# Patient Record
Sex: MALE | Race: WHITE | Employment: FULL TIME | URBAN - METROPOLITAN AREA
[De-identification: names, ages, dates, MRNs, and addresses within clinical notes are randomized per-mention and may not be internally consistent; named-entity substitution may affect disease eponyms.]

---

## 2017-11-29 ENCOUNTER — OFFICE VISIT (OUTPATIENT)
Dept: PEDIATRICS | Facility: CLINIC | Age: 57
End: 2017-11-29
Payer: COMMERCIAL

## 2017-11-29 VITALS
BODY MASS INDEX: 29.26 KG/M2 | TEMPERATURE: 97.8 F | WEIGHT: 209 LBS | DIASTOLIC BLOOD PRESSURE: 70 MMHG | HEIGHT: 71 IN | OXYGEN SATURATION: 95 % | HEART RATE: 69 BPM | SYSTOLIC BLOOD PRESSURE: 110 MMHG

## 2017-11-29 DIAGNOSIS — L71.9 ROSACEA: Primary | ICD-10-CM

## 2017-11-29 DIAGNOSIS — H10.9 CONJUNCTIVITIS, UNSPECIFIED CONJUNCTIVITIS TYPE, UNSPECIFIED LATERALITY: ICD-10-CM

## 2017-11-29 PROCEDURE — 99213 OFFICE O/P EST LOW 20 MIN: CPT | Performed by: INTERNAL MEDICINE

## 2017-11-29 RX ORDER — POLYMYXIN B SULFATE AND TRIMETHOPRIM 1; 10000 MG/ML; [USP'U]/ML
1 SOLUTION OPHTHALMIC
Qty: 1 BOTTLE | Refills: 0 | Status: SHIPPED | OUTPATIENT
Start: 2017-11-29 | End: 2017-12-06

## 2017-11-29 RX ORDER — AZELAIC ACID 0.15 G/G
0.5 GEL TOPICAL 2 TIMES DAILY
Qty: 50 G | Refills: 3 | Status: SHIPPED | OUTPATIENT
Start: 2017-11-29

## 2017-11-29 NOTE — NURSING NOTE
"Chief Complaint   Patient presents with     Infection       Initial /70 (Cuff Size: Adult Regular)  Pulse 69  Temp 97.8  F (36.6  C) (Oral)  Ht 5' 11\" (1.803 m)  Wt 209 lb (94.8 kg)  SpO2 95%  BMI 29.15 kg/m2 Estimated body mass index is 29.15 kg/(m^2) as calculated from the following:    Height as of this encounter: 5' 11\" (1.803 m).    Weight as of this encounter: 209 lb (94.8 kg).  Medication Reconciliation: gordon Banks, JOSE MARIA    "

## 2017-11-29 NOTE — PATIENT INSTRUCTIONS
Rosacea  What is rosacea?   Rosacea is a skin problem that affects the nose and face. It causes redness and lumps. Blood vessels become more visible. Sometimes the nose gets larger and looks misshapen.   Rosacea can happen at any age, but it is most often seen in adults who are fair-skinned between the ages of 30 and 50.   How does it occur?   The cause of rosacea is not well understood. It seems to run in families and so may be inherited. It may be caused by overactive blood vessels in the skin. Contrary to popular belief, rosacea is not caused by alcoholism.   Rosacea is not related to the pimples and cysts of acne. But people who have rosacea may also have acne. Acne and rosacea are often treated with the same medicines.   What are the symptoms?   The most common symptoms are a red nose and visible blood vessel patterns on the nose. In women, redness and blood vessels may appear only on the cheeks and chin. Over time the surface of the nose may become lumpy and look swollen. The nose can become quite enlarged, and its surface may become thickened with scar tissue.   Sometimes rosacea also affects the eyelids, which become red and swollen. Rarely, the surface of the eyes may be affected, causing a sense of burning and grittiness.   How is it diagnosed?   Your healthcare provider will examine your skin. In rare cases a skin biopsy (removal of a small sample of skin) may be done to confirm the diagnosis.   How is it treated?   If you have increased flushing and blushing that does not go away and gets worse, you should see your healthcare provider. Treatment of rosacea is very important because it can permanently damage facial tissues.   Rosacea is often first treated with antibiotics. Some of these medicines are taken by mouth. Others are put on the skin.   If rosacea is affecting your eyes, your provider may prescribe antibiotic medicine for your eyes. You may be referred to an eye doctor.   For  more severe cases of rosacea, an oral medicine called isotretinoin may be prescribed. Women must use this medicine very carefully. Isotretinoin causes birth defects if a woman takes it 1 to 2 months before she gets pregnant or during pregnancy.  A medicine called Finacea (azelaic acid) may be prescribed for rosacea. It is a gel medicine for acne that can be put on your skin.   Steroid creams put on the face can sometimes help. These creams should be used only under the supervision of a healthcare provider even if they are nonprescription. Sometimes the use of steroids for an extended period of time can cause skin damage, especially when they are used incorrectly on the face.   If the usual medicines do not help the problem, and especially if your nose is becoming enlarged or deformed, you may need to see a dermatologist. Dermatologists are skin specialists who can suggest other possible treatments, including various types of surgery. In some cases laser surgery is an effective treatment for rosacea. The sooner you see a dermatologist, the more effective their treatment is likely to be.   How long will the effects last?     It is rare for rosacea to go away on its own and the condition usually worsens over time. Rosacea can be successfully treated if it is diagnosed in its early stages.   How can I take care of myself?   Follow the treatment prescribed by your healthcare provider. Use the medicines as prescribed.   Avoid rubbing or massaging your face if it seems to irritate the inflamed skin.   Overexposure to sunlight can worsen the effects of rosacea. Limit your exposure to sunlight. When you are out in the sun, use sunscreen.   Both men and women with rosacea often use makeup to cover the skin changes. Make sure you avoid using irritating cosmetics.   Avoid getting hairspray and other irritating cosmetics on your face.

## 2017-11-29 NOTE — MR AVS SNAPSHOT
After Visit Summary   11/29/2017    Ronnie Oh    MRN: [id number]           Patient Information     Date Of Birth          1960        Visit Information        Provider Department      11/29/2017 11:20 AM Dmitri Lawson MD [facility]        Today's Diagnoses     Conjunctivitis, unspecified conjunctivitis type, unspecified laterality    -  1    Rosacea          Care Instructions                     Rosacea  What is rosacea?   Rosacea is a skin problem that affects the nose and face. It causes redness and lumps. Blood vessels become more visible. Sometimes the nose gets larger and looks misshapen.   Rosacea can happen at any age, but it is most often seen in adults who are fair-skinned between the ages of 30 and 50.   How does it occur?   The cause of rosacea is not well understood. It seems to run in families and so may be inherited. It may be caused by overactive blood vessels in the skin. Contrary to popular belief, rosacea is not caused by alcoholism.   Rosacea is not related to the pimples and cysts of acne. But people who have rosacea may also have acne. Acne and rosacea are often treated with the same medicines.   What are the symptoms?   The most common symptoms are a red nose and visible blood vessel patterns on the nose. In women, redness and blood vessels may appear only on the cheeks and chin. Over time the surface of the nose may become lumpy and look swollen. The nose can become quite enlarged, and its surface may become thickened with scar tissue.   Sometimes rosacea also affects the eyelids, which become red and swollen. Rarely, the surface of the eyes may be affected, causing a sense of burning and grittiness.   How is it diagnosed?   Your healthcare provider will examine your skin. In rare cases a skin biopsy (removal of a small sample of skin) may be done to confirm the diagnosis.   How is it treated?   If you have increased flushing and blushing that does  not go away and gets worse, you should see your healthcare provider. Treatment of rosacea is very important because it can permanently damage facial tissues.   Rosacea is often first treated with antibiotics. Some of these medicines are taken by mouth. Others are put on the skin.   If rosacea is affecting your eyes, your provider may prescribe antibiotic medicine for your eyes. You may be referred to an eye doctor.   For more severe cases of rosacea, an oral medicine called isotretinoin may be prescribed. Women must use this medicine very carefully. Isotretinoin causes birth defects if a woman takes it 1 to 2 months before she gets pregnant or during pregnancy.  A medicine called Finacea (azelaic acid) may be prescribed for rosacea. It is a gel medicine for acne that can be put on your skin.   Steroid creams put on the face can sometimes help. These creams should be used only under the supervision of a healthcare provider even if they are nonprescription. Sometimes the use of steroids for an extended period of time can cause skin damage, especially when they are used incorrectly on the face.   If the usual medicines do not help the problem, and especially if your nose is becoming enlarged or deformed, you may need to see a dermatologist. Dermatologists are skin specialists who can suggest other possible treatments, including various types of surgery. In some cases laser surgery is an effective treatment for rosacea. The sooner you see a dermatologist, the more effective their treatment is likely to be.   How long will the effects last?     It is rare for rosacea to go away on its own and the condition usually worsens over time. Rosacea can be successfully treated if it is diagnosed in its early stages.   How can I take care of myself?   Follow the treatment prescribed by your healthcare provider. Use the medicines as prescribed.   Avoid rubbing or massaging your face if it seems to irritate the inflamed skin.  "  Overexposure to sunlight can worsen the effects of rosacea. Limit your exposure to sunlight. When you are out in the sun, use sunscreen.   Both men and women with rosacea often use makeup to cover the skin changes. Make sure you avoid using irritating cosmetics.   Avoid getting hairspray and other irritating cosmetics on your face.             Follow-ups after your visit        Who to contact     If you have questions or need follow up information about today's clinic visit or your schedule please contact [facility] directly at [phone].  Normal or non-critical lab and imaging results will be communicated to you by Fuzehart, letter or phone within 4 business days after the clinic has received the results. If you do not hear from us within 7 days, please contact the clinic through LinkStormt or phone. If you have a critical or abnormal lab result, we will notify you by phone as soon as possible.  Submit refill requests through Bellhops or call your pharmacy and they will forward the refill request to us. Please allow 3 business days for your refill to be completed.          Additional Information About Your Visit        Fuzehart Information     Bellhops gives you secure access to your electronic health record. If you see a primary care provider, you can also send messages to your care team and make appointments. If you have questions, please call your primary care clinic.  If you do not have a primary care provider, please call [phone] and they will assist you.        Care EveryWhere ID     This is your Care EveryWhere ID. This could be used by other organizations to access your [place] medical records  [id number]        Your Vitals Were     Pulse Temperature Height Pulse Oximetry BMI (Body Mass Index)       69 97.8  F (36.6  C) (Oral) 5' 11\" (1.803 m) 95% 29.15 kg/m2        Blood Pressure from Last 3 Encounters:   11/29/17 110/70   09/29/16 120/83   09/19/16 118/78    Weight from Last 3 " Encounters:   11/29/17 209 lb (94.8 kg)   09/29/16 218 lb 8 oz (99.1 kg)   09/19/16 216 lb (98 kg)              Today, you had the following     No orders found for display         Today's Medication Changes          These changes are accurate as of: 11/29/17 12:01 PM.  If you have any questions, ask your nurse or doctor.               Start taking these medicines.        Dose/Directions    Azelaic Acid 15 % gel   Used for:  Rosacea   Started by:  Dmitri Lawson MD        Dose:  0.5 inch   Apply 0.5 inches topically 2 times daily Massage thin film gently into afected areas morning and evening.   Quantity:  50 g   Refills:  3       trimethoprim-polymyxin b ophthalmic solution   Commonly known as:  POLYTRIM   Used for:  Conjunctivitis, unspecified conjunctivitis type, unspecified laterality   Started by:  Dmitri Lawson MD        Dose:  1 drop   Apply 1 drop to eye every 3 hours for 7 days   Quantity:  1 Bottle   Refills:  0         Stop taking these medicines if you haven't already. Please contact your care team if you have questions.     albuterol 108 (90 BASE) MCG/ACT Inhaler   Commonly known as:  PROAIR HFA/PROVENTIL HFA/VENTOLIN HFA   Stopped by:  Dmitri Lawson MD           predniSONE 20 MG tablet   Commonly known as:  DELTASONE   Stopped by:  Dmitri Lawson MD                Where to get your medicines      These medications were sent to [facility]/pharmacy #[id number] [place], [place] - [address] [address]     Phone:  [phone]     Azelaic Acid 15 % gel    trimethoprim-polymyxin b ophthalmic solution                Primary Care Provider Office Phone # Fax #    Dmitri Lawson [phone]       [facility] [place] [address]        Equal Access to Services     SALVATORE HERNANDEZ AH: David Fletcher, slime sena, angel mina, yolande saenz. So [facility]  [phone].    ATENCIÓN: Si rafa perales, tiene a leggett disposición servicios gratuitos de asistencia lingüística. Zenaida newell [phone].    We comply with applicable federal civil rights laws and [place] laws. We do not discriminate on the basis of race, color, national origin, age, disability, sex, sexual orientation, or gender identity.            Thank you!     Thank you for choosing [facility]  for your care. Our goal is always to provide you with excellent care. Hearing back from our patients is one way we can continue to improve our services. Please take a few minutes to complete the written survey that you may receive in the mail after your visit with us. Thank you!             Your Updated Medication List - Protect others around you: Learn how to safely use, store and throw away your medicines at www.disposemymeds.org.          This list is accurate as of: 11/29/17 12:01 PM.  Always use your most recent med list.                   Brand Name Dispense Instructions for use Diagnosis    Azelaic Acid 15 % gel     50 g    Apply 0.5 inches topically 2 times daily Massage thin film gently into afected areas morning and evening.    Rosacea       loratadine 10 MG tablet    CLARITIN     Take 10 mg by mouth daily.        Multi-vitamin Tabs tablet   Generic drug:  multivitamin, therapeutic with minerals     90 tablet    Take 1 tablet by mouth daily.        olopatadine 0.1 % ophthalmic solution    PATANOL    5 mL    Place 1 drop into both eyes 2 times daily    Allergic conjunctivitis, unspecified laterality       trimethoprim-polymyxin b ophthalmic solution    POLYTRIM    1 Bottle    Apply 1 drop to eye every 3 hours for 7 days    Conjunctivitis, unspecified conjunctivitis type, unspecified laterality

## 2017-11-29 NOTE — PROGRESS NOTES
SUBJECTIVE:   Ronnie Oh is a 57 year old male who presents to clinic today for the following health issues:      Rash-right side of nose      Duration: 3-4 days    Description (location/character/radiation): red, swollen    Intensity:  mild    Accompanying signs and symptoms: painful to touch    History (similar episodes/previous evaluation): prior similar episodes past few years          father-hx of rosacea    Precipitating or alleviating factors: None    Therapies tried and outcome: None       Patient Active Problem List   Diagnosis     BPH (benign prostatic hypertrophy)     Obesity     CARDIOVASCULAR SCREENING; LDL GOAL LESS THAN 160     Allergic conjunctivitis     Seasonal allergies     Polysubstance abuse     Past Surgical History:   Procedure Laterality Date     ARTHROSCOPY KNEE WITH MENISCAL REPAIR      meniscal repair     COLONOSCOPY  04/18/12    Dr. Che     COLONOSCOPY  4/18/2012    Procedure:COLONOSCOPY; COLONOSCOPY   ; Surgeon:NAIF CHE; Location:[facility]     STRIP VEIN BILATERAL      vein stripping for varicose veins     SURGICAL PATHOLOGY EXAM         Social History   Substance Use Topics     Smoking status: Former Smoker     Smokeless tobacco: Former User     Alcohol use No     Family History   Problem Relation Age of Onset     DIABETES No family hx of      Cancer - colorectal No family hx of      Prostate Cancer No family hx of      Hypertension No family hx of      C.A.D. No family hx of          Current Outpatient Prescriptions   Medication Sig Dispense Refill     trimethoprim-polymyxin b (POLYTRIM) ophthalmic solution Apply 1 drop to eye every 3 hours for 7 days 1 Bottle 0     Azelaic Acid 15 % gel Apply 0.5 inches topically 2 times daily Massage thin film gently into afected areas morning and evening. 50 g 3     olopatadine (PATANOL) 0.1 % ophthalmic solution Place 1 drop into both eyes 2 times daily 5 mL 6     loratadine (CLARITIN) 10 MG tablet Take 10 mg by mouth daily.    "    Multiple Vitamin (MULTI-VITAMIN) per tablet Take 1 tablet by mouth daily. 90 tablet 3       ROS: The following systems have been completely reviewed and are negative except as noted in the HPI: CONSTITUTIONAL, HEAD AND NECK    OBJECTIVE:                                                    /70 (Cuff Size: Adult Regular)  Pulse 69  Temp 97.8  F (36.6  C) (Oral)  Ht 5' 11\" (1.803 m)  Wt 209 lb (94.8 kg)  SpO2 95%  BMI 29.15 kg/m2 Body mass index is 29.15 kg/(m^2).  GENERAL:  alert,  no distress  HENT: OP normal.  Eyes: conj pink, sclera normal      Erythematous indurated patch 2-3cm in size adjacent to right side of nose/does not involve eye    Facial telangiectasias/erythema across cheeks and bridge of nose  NECK: no tenderness, no adenopathy       ASSESSMENT/PLAN:                                                        ICD-10-CM    1. Rosacea L71.9 Azelaic Acid 15 % gel     Dx discussed, started trial of finacea gel     2. Conjunctivitis, unspecified conjunctivitis type, unspecified laterality H10.9 trimethoprim-polymyxin b (POLYTRIM) ophthalmic solution    Pt also requests script for polytrim for recurrent pink eye (no sx currently)/ signs and sx of acute conjunctivitis reviewed        Dmitri Lawson MD  [facility]     "

## 2019-10-03 ENCOUNTER — HEALTH MAINTENANCE LETTER (OUTPATIENT)
Age: 59
End: 2019-10-03

## 2020-09-15 ENCOUNTER — MYC MEDICAL ADVICE (OUTPATIENT)
Dept: PEDIATRICS | Facility: CLINIC | Age: 60
End: 2020-09-15

## 2020-09-26 ENCOUNTER — OFFICE VISIT (OUTPATIENT)
Dept: URGENT CARE | Facility: URGENT CARE | Age: 60
End: 2020-09-26
Payer: COMMERCIAL

## 2020-09-26 VITALS
BODY MASS INDEX: 30.27 KG/M2 | WEIGHT: 217 LBS | DIASTOLIC BLOOD PRESSURE: 83 MMHG | OXYGEN SATURATION: 96 % | TEMPERATURE: 96.4 F | HEART RATE: 61 BPM | RESPIRATION RATE: 12 BRPM | SYSTOLIC BLOOD PRESSURE: 128 MMHG

## 2020-09-26 DIAGNOSIS — G56.90 NEUROPATHY OF UPPER EXTREMITY, UNSPECIFIED LATERALITY: Primary | ICD-10-CM

## 2020-09-26 PROCEDURE — 99213 OFFICE O/P EST LOW 20 MIN: CPT | Performed by: FAMILY MEDICINE

## 2020-09-26 RX ORDER — CETIRIZINE HYDROCHLORIDE 10 MG/1
10 TABLET ORAL DAILY
COMMUNITY

## 2020-09-26 NOTE — PROGRESS NOTES
SUBJECTIVE:  Ronnie Oh, a 60 year old male scheduled an appointment to discuss the following issues:  Neuropathy of upper extremity, unspecified laterality    Medical, social, surgical, and family histories reviewed.    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  No pain or redness or swelling.  Pt is left handed he  who types a lot.  He rode his bike for one hour (13.8 miles in 56 minutes) the day before, and played golf the day before that.  Not on any medication.  Likes to sleep on his left side pressing on his left arm.  Pt is left handed.  No change in speech or vision or memory.  Numbness and tingling has improved as the day goes on with more movement.    ROS:  See HPI.  No nausea/vomiting.  No fever/chills.  No chest pain/SOB.  No BM/urine problems.  No dizziness or syncope.      OBJECTIVE:  /83   Pulse 61   Temp 96.4  F (35.8  C) (Temporal)   Resp 12   Wt 98.4 kg (217 lb)   SpO2 96%   BMI 30.27 kg/m    EXAM:  GENERAL APPEARANCE: healthy, alert and no distress, afebrile, no cyanosis or accessory muscle use; normal vitals  EYES: Eyes grossly normal to inspection, PERRL and conjunctivae and sclerae normal  HENT: ear canals and TM's normal and nose and mouth without ulcers or lesions  NECK: no adenopathy, no asymmetry, masses, or scars and neck normal to palpation  RESP: lungs clear to auscultation - no rales, rhonchi or wheezes  CV: regular rates and rhythm, normal S1 S2, no S3 or S4 and no murmur, click or rub  LYMPHATICS: no cervical adenopathy  ABDOMEN: soft, nontender, without hepatosplenomegaly or masses and bowel sounds normal  MS: extremities normal- no gross deformities noted  SKIN: no suspicious lesions or rashes  NEURO: Normal strength and tone, mentation intact and speech normal; no facial asymmetry or pronator drift; normal gait; positive tinnel  signs, negative Phalen; some tenderness at left ulnar groove; normal 5/5 strength bilateral biceps and triceps; normal ROM bilateral shoulders and wrists.  Normal radial pulses.    ASSESSMENT/PLAN:  (G56.90) Neuropathy of upper extremity, unspecified laterality  (primary encounter diagnosis)  Comment: involving left median and ulnar nerve---likely peripheral; could have carpal tunnel syndrome  Plan: NEUROLOGY ADULT REFERRAL      Care instructions given.     Pt to f/up PCP/neurology in 2-3 weeks if no improvement or new problems arise.  Warning signs and symptoms explained---be seen ASAP if worsening.

## 2020-09-26 NOTE — PATIENT INSTRUCTIONS
Patient Education     Peripheral Neuropathy  Peripheral neuropathy is the result of damage to the peripheral nerves. It usually affects the arms or legs, and causes a change in physical feeling. Sometimes it causes weakness in the muscles. You may feel tingling, numbness or shooting pains. Symptoms may be more common at night. Skin may be extra sensitive to light touch or temperature changes.  Neuropathy may be caused by a complication of a chronic disease such as diabetes, virus or bacterial infections, or physical injury. A ruptured disk with pressure on the spinal nerve may also lead to the problem. Certain vitamin deficiencies may also lead to it. It may also be caused by exposure to certain drugs or chemicals.  Home care    Tell the healthcare provider about all medicines you take. This includes prescription and over-the-counter medicines, vitamins, and herbs. Ask if any of the medicines may be causing your problems. Don't make any changes to prescription medicines without talking to your healthcare provider first.    You may be prescribed medicines to help relieve the tingling feeling or for pain. Take all medicines as directed.    A numb hand or foot may be more prone to injury. To help protect it:  ? Always use oven mitts.  ? Test water with an unaffected hand or foot.  ? Use caution when trimming nails. File sharp areas.  ? Wear shoes that fit well to avoid pressure points, blisters, and ulcers.  ? Inspect your hands and feet carefully (including the soles of your feet and between your toes) at least once a week. If you see red areas, sores, or other problems, tell your healthcare provider.    Follow-up care  Follow up with your doctor or as advised by our staff. You may need further testing or evaluation.  When to seek medical advice  Call your healthcare provider right away if any of the following occur:    Redness, swelling, cracking, or ulcer on any numb area, especially the feet    New symptoms of  numbness or muscle weakness numbness    Loss of bowel or bladder control    Slurred speech, confusion, or trouble speaking, walking, or seeing  Date Last Reviewed: 3/1/2018    [id number] [employer]. [address] [place] [address], [place] [address]. All rights reserved. This information is not intended as a substitute for professional medical care. Always follow your healthcare professional's instructions.

## 2020-10-20 ENCOUNTER — OFFICE VISIT (OUTPATIENT)
Dept: PEDIATRICS | Facility: CLINIC | Age: 60
End: 2020-10-20
Payer: COMMERCIAL

## 2020-10-20 VITALS
WEIGHT: 220.9 LBS | TEMPERATURE: 97.3 F | SYSTOLIC BLOOD PRESSURE: 120 MMHG | HEART RATE: 73 BPM | DIASTOLIC BLOOD PRESSURE: 64 MMHG | OXYGEN SATURATION: 97 % | BODY MASS INDEX: 30.81 KG/M2

## 2020-10-20 DIAGNOSIS — Z12.11 SCREENING FOR COLON CANCER: ICD-10-CM

## 2020-10-20 DIAGNOSIS — F19.10 POLYSUBSTANCE ABUSE (H): ICD-10-CM

## 2020-10-20 DIAGNOSIS — Z12.5 SCREENING FOR PROSTATE CANCER: ICD-10-CM

## 2020-10-20 DIAGNOSIS — Z13.6 CARDIOVASCULAR SCREENING; LDL GOAL LESS THAN 160: ICD-10-CM

## 2020-10-20 DIAGNOSIS — Z00.00 ROUTINE GENERAL MEDICAL EXAMINATION AT A HEALTH CARE FACILITY: Primary | ICD-10-CM

## 2020-10-20 LAB
ALBUMIN SERPL-MCNC: 4.2 G/DL (ref 3.4–5)
ALP SERPL-CCNC: 46 U/L (ref 40–150)
ALT SERPL W P-5'-P-CCNC: 41 U/L (ref 0–70)
ANION GAP SERPL CALCULATED.3IONS-SCNC: 5 MMOL/L (ref 3–14)
AST SERPL W P-5'-P-CCNC: 23 U/L (ref 0–45)
BASOPHILS # BLD AUTO: 0 10E9/L (ref 0–0.2)
BASOPHILS NFR BLD AUTO: 0.2 %
BILIRUB SERPL-MCNC: 1 MG/DL (ref 0.2–1.3)
BUN SERPL-MCNC: 21 MG/DL (ref 7–30)
CALCIUM SERPL-MCNC: 10 MG/DL (ref 8.5–10.1)
CHLORIDE SERPL-SCNC: 109 MMOL/L (ref 94–109)
CHOLEST SERPL-MCNC: 211 MG/DL
CO2 SERPL-SCNC: 27 MMOL/L (ref 20–32)
CREAT SERPL-MCNC: 1.07 MG/DL (ref 0.66–1.25)
DIFFERENTIAL METHOD BLD: NORMAL
EOSINOPHIL # BLD AUTO: 0.3 10E9/L (ref 0–0.7)
EOSINOPHIL NFR BLD AUTO: 4.4 %
ERYTHROCYTE [DISTWIDTH] IN BLOOD BY AUTOMATED COUNT: 14.3 % (ref 10–15)
GFR SERPL CREATININE-BSD FRML MDRD: 75 ML/MIN/{1.73_M2}
GLUCOSE SERPL-MCNC: 83 MG/DL (ref 70–99)
HCT VFR BLD AUTO: 46.9 % (ref 40–53)
HDLC SERPL-MCNC: 45 MG/DL
HGB BLD-MCNC: 15.7 G/DL (ref 13.3–17.7)
LDLC SERPL CALC-MCNC: 146 MG/DL
LYMPHOCYTES # BLD AUTO: 2.1 10E9/L (ref 0.8–5.3)
LYMPHOCYTES NFR BLD AUTO: 32.8 %
MCH RBC QN AUTO: 29.8 PG (ref 26.5–33)
MCHC RBC AUTO-ENTMCNC: 33.5 G/DL (ref 31.5–36.5)
MCV RBC AUTO: 89 FL (ref 78–100)
MONOCYTES # BLD AUTO: 0.6 10E9/L (ref 0–1.3)
MONOCYTES NFR BLD AUTO: 9.5 %
NEUTROPHILS # BLD AUTO: 3.4 10E9/L (ref 1.6–8.3)
NEUTROPHILS NFR BLD AUTO: 53.1 %
NONHDLC SERPL-MCNC: 166 MG/DL
PLATELET # BLD AUTO: 228 10E9/L (ref 150–450)
POTASSIUM SERPL-SCNC: 4.1 MMOL/L (ref 3.4–5.3)
PROT SERPL-MCNC: 7.8 G/DL (ref 6.8–8.8)
PSA SERPL-ACNC: 1.48 UG/L (ref 0–4)
RBC # BLD AUTO: 5.26 10E12/L (ref 4.4–5.9)
SODIUM SERPL-SCNC: 141 MMOL/L (ref 133–144)
TRIGL SERPL-MCNC: 102 MG/DL
WBC # BLD AUTO: 6.4 10E9/L (ref 4–11)

## 2020-10-20 PROCEDURE — 36415 COLL VENOUS BLD VENIPUNCTURE: CPT | Performed by: INTERNAL MEDICINE

## 2020-10-20 PROCEDURE — 85025 COMPLETE CBC W/AUTO DIFF WBC: CPT | Performed by: INTERNAL MEDICINE

## 2020-10-20 PROCEDURE — 99396 PREV VISIT EST AGE 40-64: CPT | Performed by: INTERNAL MEDICINE

## 2020-10-20 PROCEDURE — 80061 LIPID PANEL: CPT | Performed by: INTERNAL MEDICINE

## 2020-10-20 PROCEDURE — G0103 PSA SCREENING: HCPCS | Performed by: INTERNAL MEDICINE

## 2020-10-20 PROCEDURE — 80053 COMPREHEN METABOLIC PANEL: CPT | Performed by: INTERNAL MEDICINE

## 2020-10-20 ASSESSMENT — ENCOUNTER SYMPTOMS
ABDOMINAL PAIN: 0
HEMATURIA: 0
DIZZINESS: 0
CONSTIPATION: 0
DIARRHEA: 0
EYE PAIN: 0
COUGH: 0
CHILLS: 0
HEMATOCHEZIA: 0
NERVOUS/ANXIOUS: 0

## 2020-10-20 NOTE — PROGRESS NOTES
SUBJECTIVE:   CC: Ronnie Oh is an 60 year old male who presents for preventive health visit.       Patient has been advised of split billing requirements and indicates understanding: Yes     Answers for HPI/ROS submitted by the patient on 10/20/2020   Annual Exam:  Frequency of exercise:: 2-3 days/week  Getting at least 3 servings of Calcium per day:: NO  Diet:: Carbohydrate counting  Taking medications regularly:: Not Applicable  Medication side effects:: Not applicable  Bi-annual eye exam:: NO, not recently   Dental care twice a year:: NO  Sleep apnea or symptoms of sleep apnea:: None  abdominal pain: No  Blood in stool: No  Blood in urine: No  chest pain: No  chills: No  congestion: No  constipation: No  cough: No  diarrhea: No  dizziness: No  ear pain: No  eye pain: No  nervous/anxious: No  Additional concerns today:: Yes, labs   Duration of exercise:: 15-30 minutes    Fatigue     Sore when he goes to bed and when he wakes up    Concerned regarding screening because patient's friend recently diagnosed with cancer (prostate)      Today's PHQ-2 Score:   PHQ-2 ( 1999 [employer]) 10/20/2020 9/19/2016   Q1: Little interest or pleasure in doing things 0 0   Q2: Feeling down, depressed or hopeless 0 0   PHQ-2 Score 0 0   Q1: Little interest or pleasure in doing things Not at all -   Q2: Feeling down, depressed or hopeless Not at all -   PHQ-2 Score 0 -       Patient Active Problem List   Diagnosis     BPH (benign prostatic hypertrophy)     Obesity     CARDIOVASCULAR SCREENING; LDL GOAL LESS THAN 160     Seasonal allergies     Polysubstance abuse (H)     Past Medical History:   Diagnosis Date     Lipoma      Polysubstance abuse (H)     hx of marijuana and crack cocaine use, sober since 2007     Varicose veins        Past Surgical History:   Procedure Laterality Date     ARTHROSCOPY KNEE WITH MENISCAL REPAIR      meniscal repair     COLONOSCOPY  04/18/12    Dr. Che     COLONOSCOPY  4/18/2012     Procedure:COLONOSCOPY; COLONOSCOPY   ; Surgeon:NAIF KAPOOR; Location:[facility]     STRIP VEIN BILATERAL      vein stripping for varicose veins     SURGICAL PATHOLOGY EXAM         Family History   Problem Relation Age of Onset     Diabetes No family hx of      Cancer - colorectal No family hx of      Prostate Cancer No family hx of      Hypertension No family hx of      C.A.D. No family hx of        ALLERGIES     Allergies   Allergen Reactions     Cats Itching     sneezing     Mold Itching     sneezing     Seasonal Allergies      Trees Other (See Comments) and Itching     sneezing     Current Outpatient Medications   Medication Sig Dispense Refill     Azelaic Acid 15 % gel Apply 0.5 inches topically 2 times daily Massage thin film gently into afected areas morning and evening. (Patient not taking: Reported on 9/26/2020) 50 g 3     cetirizine (ZYRTEC) 10 MG tablet Take 10 mg by mouth daily       loratadine (CLARITIN) 10 MG tablet Take 10 mg by mouth daily.       Multiple Vitamin (MULTI-VITAMIN) per tablet Take 1 tablet by mouth daily. 90 tablet 3     olopatadine (PATANOL) 0.1 % ophthalmic solution Place 1 drop into both eyes 2 times daily (Patient not taking: Reported on 9/26/2020) 5 mL 6      Abuse: Current or Past(Physical, Sexual or Emotional)- No  Do you feel safe in your environment? No    Have you ever done Advance Care Planning? (For example, a Health Directive, POLST, or a discussion with a medical provider or your loved ones about your wishes): No, advance care planning information given to patient to review.  Patient declined advance care planning discussion at this time.    Social History     Tobacco Use     Smoking status: Former Smoker     Smokeless tobacco: Former User   Substance Use Topics     Alcohol use: No     If you drink alcohol do you typically have >3 drinks per day or >7 drinks per week? No                      Last PSA:   PSA   Date Value Ref Range Status   04/02/2015 0.97 0 - 4 ug/L Final        Reviewed orders with patient. Reviewed health maintenance and updated orders accordingly - Yes  Lab work is in process    Reviewed and updated as needed this visit by clinical staff  Tobacco  Allergies    Med Hx  Surg Hx  Fam Hx  Soc Hx        Reviewed and updated as needed this visit by Provider                ROS:  CONSTITUTIONAL: NEGATIVE for fever, chills  INTEGUMENTARY/SKIN: NEGATIVE for worrisome rashes, moles or lesions  EYES: NEGATIVE for vision changes or irritation  ENT: NEGATIVE for ear, mouth and throat problems  RESP: NEGATIVE for significant cough or SOB  CV: NEGATIVE for chest pain, palpitations or peripheral edema  GI: NEGATIVE for nausea, abdominal pain, heartburn, or change in bowel habits   male: negative for dysuria, hematuria, decreased urinary stream, erectile dysfunction, urethral discharge  MUSCULOSKELETAL: NEGATIVE for significant arthralgias or myalgia  NEURO: NEGATIVE for weakness, dizziness or paresthesias  PSYCHIATRIC: NEGATIVE for changes in mood or affect    OBJECTIVE:   /64 (BP Location: Right arm, Patient Position: Sitting, Cuff Size: Adult Large)   Pulse 73   Temp 97.3  F (36.3  C) (Tympanic)   Wt 100.2 kg (220 lb 14.4 oz)   SpO2 97%   BMI 30.81 kg/m    EXAM:  GENERAL: healthy, alert and no distress  EYES: Eyes grossly normal to inspection, PERRL and conjunctivae and sclerae normal  HENT: ear canals and TM's normal, nose and mouth without ulcers or lesions  NECK: no adenopathy, no asymmetry, masses, or scars and thyroid normal to palpation  RESP: lungs clear to auscultation - no rales, rhonchi or wheezes  CV: regular rate and rhythm, normal S1 S2, no S3 or S4, no murmur, click or rub, no peripheral edema and peripheral pulses strong  ABDOMEN: soft, nontender, no hepatosplenomegaly, no masses and bowel sounds normal  MS: no gross musculoskeletal defects noted, no edema  SKIN: no suspicious lesions or rashes  NEURO: Normal strength and tone, mentation intact  "and speech normal  PSYCH: mentation appears normal, affect normal/bright    ASSESSMENT/PLAN:       ICD-10-CM    1. Routine general medical examination at a health care facility  Z00.00 CBC with platelets differential       2. Polysubstance abuse (H)  F19.10 Sober for more than 12 years.   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 Screening for prostate cancer  Z12.5 Prostate spec antigen screen     4. Screening for colon cancer  Z12.11 Fecal colorectal cancer screen (FIT)      Declines colonoscopy, check FOBT     5. CARDIOVASCULAR SCREENING; LDL GOAL LESS THAN 160  Z13.6 Lipid panel reflex to direct LDL Fasting     Comprehensive metabolic panel     Patient has been advised of split billing requirements and indicates understanding: Yes  COUNSELING:  Reviewed preventive health counseling, as reflected in patient instructions       Regular exercise       Healthy diet/nutrition       Colon cancer screening       Prostate cancer screening    Estimated body mass index is 30.81 kg/m  as calculated from the following:    Height as of 11/29/17: 1.803 m (5' 11\").    Weight as of this encounter: 100.2 kg (220 lb 14.4 oz).    Weight management plan: Discussed healthy diet and exercise guidelines    He reports that he has quit smoking. He has quit using smokeless tobacco.      Counseling Resources:  ATP IV Guidelines  Pooled Cohorts Equation Calculator  FRAX Risk Assessment  ICSI Preventive Guidelines  Dietary Guidelines for Americans, 2010  USDA's MyPlate  ASA Prophylaxis  Lung CA Screening    Dmitri Lawson MD  [facility]  "

## 2021-01-15 ENCOUNTER — HEALTH MAINTENANCE LETTER (OUTPATIENT)
Age: 61
End: 2021-01-15

## 2021-01-26 ENCOUNTER — TELEPHONE (OUTPATIENT)
Dept: PEDIATRICS | Facility: CLINIC | Age: 61
End: 2021-01-26

## 2021-09-05 ENCOUNTER — HEALTH MAINTENANCE LETTER (OUTPATIENT)
Age: 61
End: 2021-09-05

## 2021-12-26 ENCOUNTER — HEALTH MAINTENANCE LETTER (OUTPATIENT)
Age: 61
End: 2021-12-26

## 2021-12-31 ENCOUNTER — IMMUNIZATION (OUTPATIENT)
Dept: NURSING | Facility: CLINIC | Age: 61
End: 2021-12-31
Payer: COMMERCIAL

## 2021-12-31 PROCEDURE — 0004A PR COVID VAC PFIZER DIL RECON 30 MCG/0.3 ML IM: CPT

## 2021-12-31 PROCEDURE — 91300 PR COVID VAC PFIZER DIL RECON 30 MCG/0.3 ML IM: CPT

## 2022-10-23 ENCOUNTER — HEALTH MAINTENANCE LETTER (OUTPATIENT)
Age: 62
End: 2022-10-23

## 2023-04-02 ENCOUNTER — HEALTH MAINTENANCE LETTER (OUTPATIENT)
Age: 63
End: 2023-04-02

## 2024-06-02 ENCOUNTER — HEALTH MAINTENANCE LETTER (OUTPATIENT)
Age: 64
End: 2024-06-02